# Patient Record
Sex: MALE | Race: WHITE | NOT HISPANIC OR LATINO | Employment: FULL TIME | URBAN - METROPOLITAN AREA
[De-identification: names, ages, dates, MRNs, and addresses within clinical notes are randomized per-mention and may not be internally consistent; named-entity substitution may affect disease eponyms.]

---

## 2023-06-26 LAB
CREATININE (MG/DL) IN SER/PLAS: 0.89 MG/DL (ref 0.5–1.3)
GFR MALE: >90 ML/MIN/1.73M2
PARATHYRIN INTACT (PG/ML) IN SER/PLAS: 53.7 PG/ML (ref 18.5–88)
POC CALCIUM IONIZED (MMOL/L) IN BLOOD: 1.18 MMOL/L (ref 1.1–1.33)
UREA NITROGEN (MG/DL) IN SER/PLAS: 25 MG/DL (ref 6–23)

## 2023-08-29 PROBLEM — E11.9 DIABETES MELLITUS, TYPE 2 (MULTI): Status: ACTIVE | Noted: 2023-08-29

## 2023-08-29 PROBLEM — C41.0: Status: ACTIVE | Noted: 2023-08-29

## 2023-08-29 PROBLEM — E66.9 OBESITY: Status: ACTIVE | Noted: 2023-08-29

## 2023-08-29 PROBLEM — I25.10 CORONARY ARTERIOSCLEROSIS: Status: ACTIVE | Noted: 2023-08-29

## 2023-08-29 PROBLEM — R07.9 CHEST PAIN: Status: ACTIVE | Noted: 2023-08-29

## 2023-08-29 PROBLEM — H90.A22 SENSORINEURAL HEARING LOSS (SNHL) OF LEFT EAR WITH RESTRICTED HEARING OF RIGHT EAR: Status: ACTIVE | Noted: 2023-08-29

## 2023-08-29 PROBLEM — Z85.828 HISTORY OF BASAL CELL CARCINOMA: Status: ACTIVE | Noted: 2023-08-29

## 2023-08-29 PROBLEM — H02.834 DERMATOCHALASIS OF LEFT UPPER EYELID: Status: ACTIVE | Noted: 2023-08-29

## 2023-08-29 PROBLEM — H93.8X1 MASS OF RIGHT EAR: Status: ACTIVE | Noted: 2023-08-29

## 2023-08-29 PROBLEM — E11.65 HYPERGLYCEMIA DUE TO TYPE 2 DIABETES MELLITUS (MULTI): Status: ACTIVE | Noted: 2023-08-29

## 2023-08-29 PROBLEM — E78.2 MIXED HYPERLIPIDEMIA: Status: ACTIVE | Noted: 2023-08-29

## 2023-08-29 PROBLEM — H90.A31 MIXED CONDUCTIVE AND SENSORINEURAL HEARING LOSS, UNILATERAL, RIGHT EAR WITH RESTRICTED HEARING ON THE CONTRALATERAL SIDE: Status: ACTIVE | Noted: 2023-08-29

## 2023-08-29 PROBLEM — I25.10 CORONARY ARTERY DISEASE INVOLVING NATIVE HEART WITHOUT ANGINA PECTORIS: Status: ACTIVE | Noted: 2023-08-29

## 2023-08-29 PROBLEM — H02.831 DERMATOCHALASIS OF RIGHT UPPER EYELID: Status: ACTIVE | Noted: 2023-08-29

## 2023-08-29 PROBLEM — D49.2: Status: ACTIVE | Noted: 2023-08-29

## 2023-08-29 PROBLEM — C44.212 BASAL CELL CARCINOMA OF RIGHT EAR: Status: ACTIVE | Noted: 2023-08-29

## 2023-08-29 PROBLEM — H25.10 NUCLEAR SENILE CATARACT: Status: ACTIVE | Noted: 2023-08-29

## 2023-08-29 PROBLEM — H92.11 OTORRHEA OF RIGHT EAR: Status: ACTIVE | Noted: 2023-08-29

## 2023-08-29 PROBLEM — D35.1 PARATHYROID ADENOMA: Status: ACTIVE | Noted: 2023-08-29

## 2023-08-29 PROBLEM — R53.83 FATIGUE: Status: ACTIVE | Noted: 2023-08-29

## 2023-08-29 PROBLEM — I25.2 HISTORY OF MYOCARDIAL INFARCTION: Status: ACTIVE | Noted: 2023-08-29

## 2023-08-29 PROBLEM — Z98.61 CORONARY ANGIOPLASTY STATUS: Status: ACTIVE | Noted: 2023-08-29

## 2023-08-29 PROBLEM — R06.09 DYSPNEA ON EXERTION: Status: ACTIVE | Noted: 2023-08-29

## 2023-08-29 PROBLEM — G47.9 SLEEP DISTURBANCE: Status: ACTIVE | Noted: 2023-08-29

## 2023-08-29 PROBLEM — I10 ESSENTIAL HYPERTENSION: Status: ACTIVE | Noted: 2023-08-29

## 2023-08-29 PROBLEM — I82.90 VENOUS THROMBOSIS: Status: ACTIVE | Noted: 2023-08-29

## 2023-08-29 PROBLEM — I21.3 ACUTE ST SEGMENT ELEVATION MYOCARDIAL INFARCTION (MULTI): Status: ACTIVE | Noted: 2023-08-29

## 2023-08-29 PROBLEM — H90.11 CONDUCTIVE HEARING LOSS OF RIGHT EAR: Status: ACTIVE | Noted: 2023-08-29

## 2023-08-29 PROBLEM — H93.13 TINNITUS OF BOTH EARS: Status: ACTIVE | Noted: 2023-08-29

## 2023-08-29 PROBLEM — H92.09 OTALGIA: Status: ACTIVE | Noted: 2023-08-29

## 2023-08-29 RX ORDER — PETROLATUM,WHITE 100 %
JELLY (GRAM) MISCELLANEOUS 2 TIMES DAILY
COMMUNITY
Start: 2022-07-08

## 2023-08-29 RX ORDER — INSULIN LISPRO 100 [IU]/ML
10 INJECTION, SOLUTION INTRAVENOUS; SUBCUTANEOUS
COMMUNITY

## 2023-08-29 RX ORDER — CHLORHEXIDINE GLUCONATE ORAL RINSE 1.2 MG/ML
SOLUTION DENTAL EVERY 12 HOURS
COMMUNITY
Start: 2022-06-23

## 2023-08-29 RX ORDER — NITROGLYCERIN 0.4 MG/1
TABLET SUBLINGUAL
COMMUNITY

## 2023-08-29 RX ORDER — CARBOXYMETHYLCELLULOSE SODIUM 5 MG/ML
SOLUTION/ DROPS OPHTHALMIC
COMMUNITY
Start: 2022-07-08

## 2023-08-29 RX ORDER — INSULIN LISPRO 100 [IU]/ML
INJECTION, SOLUTION INTRAVENOUS; SUBCUTANEOUS EVERY 8 HOURS
COMMUNITY
Start: 2019-09-23

## 2023-08-29 RX ORDER — INSULIN DETEMIR 100 [IU]/ML
INJECTION, SOLUTION SUBCUTANEOUS EVERY 24 HOURS
COMMUNITY
Start: 2022-03-03

## 2023-08-29 RX ORDER — ADHESIVE BANDAGE
15 BANDAGE TOPICAL
COMMUNITY
Start: 2022-07-08

## 2023-08-29 RX ORDER — CHLORTHALIDONE 25 MG/1
25 TABLET ORAL DAILY
COMMUNITY
End: 2024-04-29

## 2023-08-29 RX ORDER — CHLORHEXIDINE GLUCONATE 40 MG/ML
SOLUTION TOPICAL
COMMUNITY
Start: 2022-06-23

## 2023-08-29 RX ORDER — SENNOSIDES 8.6 MG/1
TABLET ORAL
COMMUNITY
Start: 2022-07-08

## 2023-08-29 RX ORDER — ATORVASTATIN CALCIUM 40 MG/1
40 TABLET, FILM COATED ORAL DAILY
COMMUNITY
End: 2024-04-29

## 2023-08-29 RX ORDER — CARVEDILOL 25 MG/1
25 TABLET ORAL
COMMUNITY
End: 2024-04-29

## 2023-08-29 RX ORDER — METFORMIN HYDROCHLORIDE 1000 MG/1
1000 TABLET ORAL
COMMUNITY
Start: 2022-03-03 | End: 2023-10-17

## 2023-08-29 RX ORDER — CARBOXYMETHYLCELLULOSE SODIUM 10 MG/ML
GEL OPHTHALMIC
COMMUNITY
Start: 2022-07-08

## 2023-08-29 RX ORDER — HYDRALAZINE HYDROCHLORIDE 25 MG/1
25 TABLET, FILM COATED ORAL 2 TIMES DAILY
COMMUNITY

## 2023-08-29 RX ORDER — PEN NEEDLE, DIABETIC 30 GX3/16"
NEEDLE, DISPOSABLE MISCELLANEOUS
COMMUNITY
Start: 2019-09-23

## 2023-08-29 RX ORDER — BLOOD SUGAR DIAGNOSTIC
STRIP MISCELLANEOUS
COMMUNITY
Start: 2021-08-26

## 2023-08-29 RX ORDER — INSULIN GLARGINE 100 [IU]/ML
INJECTION, SOLUTION SUBCUTANEOUS
COMMUNITY
Start: 2021-08-30

## 2023-08-29 RX ORDER — POLYETHYLENE GLYCOL 3350 17 G/17G
POWDER, FOR SOLUTION ORAL
COMMUNITY
Start: 2022-07-08

## 2023-08-29 RX ORDER — AMLODIPINE BESYLATE 10 MG/1
10 TABLET ORAL DAILY
COMMUNITY
End: 2024-04-29

## 2023-08-29 RX ORDER — SEMAGLUTIDE 0.68 MG/ML
INJECTION, SOLUTION SUBCUTANEOUS
COMMUNITY
Start: 2023-05-10 | End: 2024-04-08

## 2023-08-29 RX ORDER — LISINOPRIL 40 MG/1
40 TABLET ORAL DAILY
COMMUNITY

## 2023-08-29 RX ORDER — OXYCODONE HYDROCHLORIDE 5 MG/1
TABLET ORAL
COMMUNITY
Start: 2022-07-08

## 2023-08-29 RX ORDER — ACETAMINOPHEN 160 MG/5ML
200 SUSPENSION, ORAL (FINAL DOSE FORM) ORAL DAILY
COMMUNITY
Start: 2020-04-06

## 2023-08-29 RX ORDER — ASPIRIN 81 MG/1
81 TABLET ORAL DAILY
COMMUNITY
Start: 2022-03-03

## 2023-08-29 RX ORDER — ONDANSETRON 4 MG/1
TABLET, FILM COATED ORAL
COMMUNITY
Start: 2022-07-08

## 2023-08-29 RX ORDER — INSULIN GLARGINE 100 [IU]/ML
30 INJECTION, SOLUTION SUBCUTANEOUS
COMMUNITY

## 2023-09-24 RX ORDER — SYRINGE,SAFETY WITH NEEDLE,1ML 25GX1"
SYRINGE (EA) MISCELLANEOUS
COMMUNITY
Start: 2019-09-23

## 2023-10-06 DIAGNOSIS — Z01.810 ENCOUNTER FOR PREPROCEDURAL CARDIOVASCULAR EXAMINATION: ICD-10-CM

## 2023-10-17 RX ORDER — METFORMIN HYDROCHLORIDE 1000 MG/1
TABLET ORAL
Qty: 180 TABLET | Refills: 3 | Status: SHIPPED | OUTPATIENT
Start: 2023-10-17

## 2023-10-23 ENCOUNTER — APPOINTMENT (OUTPATIENT)
Dept: OTOLARYNGOLOGY | Facility: HOSPITAL | Age: 68
End: 2023-10-23
Payer: COMMERCIAL

## 2023-11-03 ENCOUNTER — LAB (OUTPATIENT)
Dept: LAB | Facility: LAB | Age: 68
End: 2023-11-03
Payer: COMMERCIAL

## 2023-11-03 DIAGNOSIS — Z01.89 ENCOUNTER FOR OTHER SPECIFIED SPECIAL EXAMINATIONS: Primary | ICD-10-CM

## 2023-11-03 DIAGNOSIS — E11.65 TYPE 2 DIABETES MELLITUS WITH HYPERGLYCEMIA (MULTI): ICD-10-CM

## 2023-11-03 LAB
ALBUMIN SERPL-MCNC: 4.4 G/DL (ref 3.5–5)
ALP BLD-CCNC: 100 U/L (ref 35–125)
ALT SERPL-CCNC: 25 U/L (ref 5–40)
ANION GAP SERPL CALC-SCNC: 15 MMOL/L
AST SERPL-CCNC: 23 U/L (ref 5–40)
BASOPHILS # BLD AUTO: 0.05 X10*3/UL (ref 0–0.1)
BASOPHILS NFR BLD AUTO: 0.4 %
BILIRUB DIRECT SERPL-MCNC: <0.2 MG/DL (ref 0–0.2)
BILIRUB SERPL-MCNC: 0.4 MG/DL (ref 0.1–1.2)
BUN SERPL-MCNC: 24 MG/DL (ref 8–25)
CALCIUM SERPL-MCNC: 9.2 MG/DL (ref 8.5–10.4)
CHLORIDE SERPL-SCNC: 103 MMOL/L (ref 97–107)
CO2 SERPL-SCNC: 27 MMOL/L (ref 24–31)
CREAT SERPL-MCNC: 0.9 MG/DL (ref 0.4–1.6)
EOSINOPHIL # BLD AUTO: 0.49 X10*3/UL (ref 0–0.7)
EOSINOPHIL NFR BLD AUTO: 4.3 %
ERYTHROCYTE [DISTWIDTH] IN BLOOD BY AUTOMATED COUNT: 13.7 % (ref 11.5–14.5)
EST. AVERAGE GLUCOSE BLD GHB EST-MCNC: 143 MG/DL
GFR SERPL CREATININE-BSD FRML MDRD: >90 ML/MIN/1.73M*2
GLUCOSE SERPL-MCNC: 101 MG/DL (ref 65–99)
HBA1C MFR BLD: 6.6 %
HCT VFR BLD AUTO: 45.9 % (ref 41–52)
HGB BLD-MCNC: 15.9 G/DL (ref 13.5–17.5)
IMM GRANULOCYTES # BLD AUTO: 0.05 X10*3/UL (ref 0–0.7)
IMM GRANULOCYTES NFR BLD AUTO: 0.4 % (ref 0–0.9)
LYMPHOCYTES # BLD AUTO: 2.35 X10*3/UL (ref 1.2–4.8)
LYMPHOCYTES NFR BLD AUTO: 20.8 %
MCH RBC QN AUTO: 30.3 PG (ref 26–34)
MCHC RBC AUTO-ENTMCNC: 34.6 G/DL (ref 32–36)
MCV RBC AUTO: 88 FL (ref 80–100)
MONOCYTES # BLD AUTO: 0.87 X10*3/UL (ref 0.1–1)
MONOCYTES NFR BLD AUTO: 7.7 %
NEUTROPHILS # BLD AUTO: 7.5 X10*3/UL (ref 1.2–7.7)
NEUTROPHILS NFR BLD AUTO: 66.4 %
NRBC BLD-RTO: 0 /100 WBCS (ref 0–0)
PLATELET # BLD AUTO: 253 X10*3/UL (ref 150–450)
POTASSIUM SERPL-SCNC: 3.7 MMOL/L (ref 3.4–5.1)
PROT SERPL-MCNC: 6.8 G/DL (ref 5.9–7.9)
RBC # BLD AUTO: 5.24 X10*6/UL (ref 4.5–5.9)
SODIUM SERPL-SCNC: 145 MMOL/L (ref 133–145)
WBC # BLD AUTO: 11.3 X10*3/UL (ref 4.4–11.3)

## 2023-11-03 PROCEDURE — 80053 COMPREHEN METABOLIC PANEL: CPT

## 2023-11-03 PROCEDURE — 83036 HEMOGLOBIN GLYCOSYLATED A1C: CPT

## 2023-11-03 PROCEDURE — 82248 BILIRUBIN DIRECT: CPT

## 2023-11-03 PROCEDURE — 36415 COLL VENOUS BLD VENIPUNCTURE: CPT

## 2023-11-03 PROCEDURE — 85025 COMPLETE CBC W/AUTO DIFF WBC: CPT

## 2023-11-08 ENCOUNTER — APPOINTMENT (OUTPATIENT)
Dept: PRIMARY CARE | Facility: CLINIC | Age: 68
End: 2023-11-08
Payer: COMMERCIAL

## 2023-11-27 ENCOUNTER — OFFICE VISIT (OUTPATIENT)
Dept: OTOLARYNGOLOGY | Facility: HOSPITAL | Age: 68
End: 2023-11-27
Payer: COMMERCIAL

## 2023-11-27 VITALS — BODY MASS INDEX: 37.36 KG/M2 | HEIGHT: 69 IN | WEIGHT: 252.2 LBS | TEMPERATURE: 96.8 F

## 2023-11-27 DIAGNOSIS — C44.201 MALIGNANT NEOPLASM OF SKIN OF EAR AND EXTERNAL AURICULAR CANAL: Primary | ICD-10-CM

## 2023-11-27 DIAGNOSIS — C41.0: ICD-10-CM

## 2023-11-27 PROCEDURE — 1159F MED LIST DOCD IN RCRD: CPT | Performed by: OTOLARYNGOLOGY

## 2023-11-27 PROCEDURE — 99213 OFFICE O/P EST LOW 20 MIN: CPT | Performed by: OTOLARYNGOLOGY

## 2023-11-27 PROCEDURE — 3044F HG A1C LEVEL LT 7.0%: CPT | Performed by: OTOLARYNGOLOGY

## 2023-11-27 PROCEDURE — 4010F ACE/ARB THERAPY RXD/TAKEN: CPT | Performed by: OTOLARYNGOLOGY

## 2023-11-27 PROCEDURE — 1160F RVW MEDS BY RX/DR IN RCRD: CPT | Performed by: OTOLARYNGOLOGY

## 2023-11-27 RX ORDER — BLOOD-GLUCOSE SENSOR
EACH MISCELLANEOUS
COMMUNITY
Start: 2023-05-23

## 2023-11-27 RX ORDER — PEN NEEDLE, DIABETIC 31 GX5/16"
NEEDLE, DISPOSABLE MISCELLANEOUS
COMMUNITY
Start: 2023-08-10

## 2023-11-27 ASSESSMENT — PATIENT HEALTH QUESTIONNAIRE - PHQ9
2. FEELING DOWN, DEPRESSED OR HOPELESS: NOT AT ALL
SUM OF ALL RESPONSES TO PHQ9 QUESTIONS 1 & 2: 0
1. LITTLE INTEREST OR PLEASURE IN DOING THINGS: NOT AT ALL

## 2023-11-27 NOTE — PROGRESS NOTES
Mr. Nunez is a very pleasant 68-year-old gentleman who was in his usual state of health until approximately 2-1/2 years ago when he noticed a scab in his right ear. He stated that this would occasionally bleed. He saw his primary care doctor around the time that this began however did not mention this lesion upon that evaluation. Then following this visit, the pandemic struck in he has not sought medical care for over 2 years. He also works nights and has very little contact with other individuals. Most recently he noted that this area in his ear has grown substantially and sought medical attention. 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 It has completely obstructed his ear canal and has somewhat impaired his hearing. He denies any facial twitching pain or facial weakness. On 7/5/2022 he underwent an extensive resection of this lesion including a partial auriculectomy, parotidectomy, neck dissection, temporal bone resection and left lateral arm free flap. He did well with this procedure, free from complications. A recent CT of his neck was performed which showed no evidence of recurrent disease.     On physical examination, he has a well healed flap with incisions that are clean, dry and intact. 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 His facial nerve has normal function.  The remainder of his head neck exam is within normal limits.       My impression is that Mr. Nunez has recovered well from his surgery and did not require adjuvant radiation therapy. He needs to have his flap debulked and his ptotic ear repaired for optimal functional and cosmetic outcome. He plans to schedule a cochlear implant procedure with Dr. Nuñez after the new year and ideally, we coordinate the surgery so that we could perform both procedures in one setting.   Otherwise, the patient will see me back in 4 months and we will perform another CT neck next  summer.

## 2023-12-15 ENCOUNTER — APPOINTMENT (OUTPATIENT)
Dept: PRIMARY CARE | Facility: CLINIC | Age: 68
End: 2023-12-15
Payer: COMMERCIAL

## 2024-04-06 DIAGNOSIS — E11.65 TYPE 2 DIABETES MELLITUS WITH HYPERGLYCEMIA (MULTI): ICD-10-CM

## 2024-04-08 RX ORDER — SEMAGLUTIDE 0.68 MG/ML
INJECTION, SOLUTION SUBCUTANEOUS
Qty: 3 ML | Refills: 3 | Status: SHIPPED | OUTPATIENT
Start: 2024-04-08

## 2024-04-28 DIAGNOSIS — E78.2 MIXED HYPERLIPIDEMIA: ICD-10-CM

## 2024-04-28 DIAGNOSIS — I10 ESSENTIAL (PRIMARY) HYPERTENSION: ICD-10-CM

## 2024-04-29 RX ORDER — AMLODIPINE BESYLATE 10 MG/1
10 TABLET ORAL DAILY
Qty: 90 TABLET | Refills: 3 | Status: SHIPPED | OUTPATIENT
Start: 2024-04-29

## 2024-04-29 RX ORDER — CHLORTHALIDONE 25 MG/1
25 TABLET ORAL DAILY
Qty: 90 TABLET | Refills: 3 | Status: SHIPPED | OUTPATIENT
Start: 2024-04-29

## 2024-04-29 RX ORDER — CARVEDILOL 25 MG/1
TABLET ORAL
Qty: 180 TABLET | Refills: 3 | Status: SHIPPED | OUTPATIENT
Start: 2024-04-29

## 2024-04-29 RX ORDER — ATORVASTATIN CALCIUM 40 MG/1
40 TABLET, FILM COATED ORAL DAILY
Qty: 90 TABLET | Refills: 3 | Status: SHIPPED | OUTPATIENT
Start: 2024-04-29

## 2024-05-12 DIAGNOSIS — E11.65 TYPE 2 DIABETES MELLITUS WITH HYPERGLYCEMIA (MULTI): ICD-10-CM

## 2024-06-10 ENCOUNTER — APPOINTMENT (OUTPATIENT)
Dept: OTOLARYNGOLOGY | Facility: HOSPITAL | Age: 69
End: 2024-06-10
Payer: COMMERCIAL

## 2024-08-09 DIAGNOSIS — I10 ESSENTIAL (PRIMARY) HYPERTENSION: ICD-10-CM

## 2024-08-09 DIAGNOSIS — Z01.810 ENCOUNTER FOR PREPROCEDURAL CARDIOVASCULAR EXAMINATION: ICD-10-CM

## 2024-08-09 RX ORDER — LISINOPRIL 40 MG/1
40 TABLET ORAL DAILY
Qty: 90 TABLET | Refills: 3 | Status: SHIPPED | OUTPATIENT
Start: 2024-08-09

## 2024-08-09 RX ORDER — METFORMIN HYDROCHLORIDE 1000 MG/1
1000 TABLET ORAL
Qty: 180 TABLET | Refills: 3 | Status: SHIPPED | OUTPATIENT
Start: 2024-08-09

## 2024-08-09 RX ORDER — HYDRALAZINE HYDROCHLORIDE 25 MG/1
TABLET, FILM COATED ORAL
Qty: 180 TABLET | Refills: 3 | Status: SHIPPED | OUTPATIENT
Start: 2024-08-09

## 2024-09-06 DIAGNOSIS — E11.65 TYPE 2 DIABETES MELLITUS WITH HYPERGLYCEMIA (MULTI): ICD-10-CM

## 2024-09-06 RX ORDER — SEMAGLUTIDE 0.68 MG/ML
INJECTION, SOLUTION SUBCUTANEOUS
Qty: 3 ML | Refills: 3 | Status: SHIPPED | OUTPATIENT
Start: 2024-09-06

## 2024-09-22 DIAGNOSIS — E11.65 TYPE 2 DIABETES MELLITUS WITH HYPERGLYCEMIA, WITHOUT LONG-TERM CURRENT USE OF INSULIN: Primary | ICD-10-CM

## 2024-09-23 RX ORDER — BLOOD SUGAR DIAGNOSTIC
STRIP MISCELLANEOUS
Qty: 300 STRIP | Refills: 3 | Status: SHIPPED | OUTPATIENT
Start: 2024-09-23

## 2024-09-25 ENCOUNTER — OFFICE VISIT (OUTPATIENT)
Dept: OPHTHALMOLOGY | Facility: CLINIC | Age: 69
End: 2024-09-25
Payer: COMMERCIAL

## 2024-09-25 DIAGNOSIS — H25.813 COMBINED FORMS OF AGE-RELATED CATARACT OF BOTH EYES: ICD-10-CM

## 2024-09-25 DIAGNOSIS — H02.834 DERMATOCHALASIS OF BOTH UPPER EYELIDS: ICD-10-CM

## 2024-09-25 DIAGNOSIS — E11.69 TYPE 2 DIABETES MELLITUS WITH OTHER SPECIFIED COMPLICATION, UNSPECIFIED WHETHER LONG TERM INSULIN USE (MULTI): ICD-10-CM

## 2024-09-25 DIAGNOSIS — H57.03 MIOSIS NOT DUE TO MIOTICS: ICD-10-CM

## 2024-09-25 DIAGNOSIS — H02.831 DERMATOCHALASIS OF BOTH UPPER EYELIDS: ICD-10-CM

## 2024-09-25 DIAGNOSIS — H40.003 PREGLAUCOMA, BILATERAL: Primary | ICD-10-CM

## 2024-09-25 PROBLEM — E11.65 HYPERGLYCEMIA DUE TO TYPE 2 DIABETES MELLITUS (MULTI): Status: RESOLVED | Noted: 2023-08-29 | Resolved: 2024-09-25

## 2024-09-25 PROBLEM — I82.90 VENOUS THROMBOSIS: Status: RESOLVED | Noted: 2023-08-29 | Resolved: 2024-09-25

## 2024-09-25 PROCEDURE — 92133 CPTRZD OPH DX IMG PST SGM ON: CPT | Performed by: OPHTHALMOLOGY

## 2024-09-25 PROCEDURE — 99214 OFFICE O/P EST MOD 30 MIN: CPT | Performed by: OPHTHALMOLOGY

## 2024-09-25 RX ORDER — BLOOD-GLUCOSE TRANSMITTER
EACH MISCELLANEOUS
COMMUNITY
Start: 2024-02-19

## 2024-09-25 ASSESSMENT — EXTERNAL EXAM - RIGHT EYE: OD_EXAM: NORMAL

## 2024-09-25 ASSESSMENT — VISUAL ACUITY
OS_SC+: +1
OD_SC: 20/25
METHOD: SNELLEN - SINGLE
OD_SC+: +1
OS_SC: 20/40

## 2024-09-25 ASSESSMENT — EXTERNAL EXAM - LEFT EYE: OS_EXAM: NORMAL

## 2024-09-25 ASSESSMENT — ENCOUNTER SYMPTOMS
NEUROLOGICAL NEGATIVE: 0
RESPIRATORY NEGATIVE: 0
ALLERGIC/IMMUNOLOGIC NEGATIVE: 0
GASTROINTESTINAL NEGATIVE: 0
HEMATOLOGIC/LYMPHATIC NEGATIVE: 0
CONSTITUTIONAL NEGATIVE: 0
EYES NEGATIVE: 0
ENDOCRINE NEGATIVE: 0
PSYCHIATRIC NEGATIVE: 0
MUSCULOSKELETAL NEGATIVE: 0
CARDIOVASCULAR NEGATIVE: 0

## 2024-09-25 ASSESSMENT — CUP TO DISC RATIO
OD_RATIO: 0.55
OS_RATIO: 0.55

## 2024-09-25 ASSESSMENT — PATIENT HEALTH QUESTIONNAIRE - PHQ9
SUM OF ALL RESPONSES TO PHQ9 QUESTIONS 1 AND 2: 0
2. FEELING DOWN, DEPRESSED OR HOPELESS: NOT AT ALL
1. LITTLE INTEREST OR PLEASURE IN DOING THINGS: NOT AT ALL

## 2024-09-25 ASSESSMENT — TONOMETRY
OS_IOP_MMHG: 14
OD_IOP_MMHG: 14
IOP_METHOD: GOLDMANN APPLANATION

## 2024-09-25 ASSESSMENT — SLIT LAMP EXAM - LIDS
COMMENTS: 2+ DERMATOCHALASIS - UPPER LID, 1+ BLEPHARITIS
COMMENTS: 2+ DERMATOCHALASIS - UPPER LID, 1+ BLEPHARITIS

## 2024-09-25 ASSESSMENT — PAIN SCALES - GENERAL: PAINLEVEL: 0-NO PAIN

## 2024-09-25 NOTE — PROGRESS NOTES
"Subjective   Patient ID: Kevin Nunez is a 69 y.o. male.    Chief Complaint    Annual Exam; Decreased Visual Acuity       HPI       Decreased Visual Acuity    Presenting in both eyes.  Severity of the pain is mild.  Context: distance vision and near vision.             Comments    PATIENT DENIES REFRACTION    Complete, pre-glaucoma, and diabetic dilated exam.  No new changes in health history or meds.  Vision essentially unchanged.  No new problems or complaints.              Last edited by Tamir Edwards MD on 9/25/2024  9:18 AM.        Current Outpatient Medications (Ophthalmology pharm classes)   Medication Sig Dispense Refill    carboxymethylcellulose (Refresh Celluvisc) 1 % ophthalmic solution dropperette Administer 1 drop into both eyes if needed. Take per directed      carboxymethylcellulose (Refresh Plus) 0.5 % ophthalmic solution Administer into affected eye(s). Take per directed       Current Outpatient Medications (Other)   Medication Sig Dispense Refill    amLODIPine (Norvasc) 10 mg tablet TAKE 1 TABLET BY MOUTH EVERY DAY 90 tablet 3    aspirin 81 mg EC tablet Take 1 tablet (81 mg) by mouth once daily.      atorvastatin (Lipitor) 40 mg tablet TAKE 1 TABLET BY MOUTH EVERY DAY 90 tablet 3    BD Ultra-Fine Mini Pen Needle 31 gauge x 3/16\" needle       carvedilol (Coreg) 25 mg tablet TAKE 1 TABLET BY MOUTH TWICE A DAY WITH FOOD FOR 90 DAYS 180 tablet 3    chlorhexidine (Hibiclens) 4 % external liquid Take per directed      chlorhexidine (Peridex) 0.12 % solution Take by mouth every 12 hours. Take per directed      chlorthalidone (Hygroton) 25 mg tablet TAKE 1 TABLET BY MOUTH EVERY DAY 90 tablet 3    CHLORTHALIDONE ORAL Take by mouth. Take per directed      coenzyme Q-10 200 mg capsule Take 1 capsule (200 mg) by mouth once daily. With a meal      coenzyme Q-10 300 mg capsule capsule Take by mouth. Take per directed      Dexcom G6 Sensor device AS DIRECTED, 1 SENSOR EVERY 10 DAYS 30 DAYS      Dexcom G6 " "Transmitter device AS DIRECTED WITH SENSOR DAILY 90 DAYS      empagliflozin (Jardiance) 25 mg Take 1 tablet (25 mg) by mouth once daily. 90 tablet 3    hydrALAZINE (Apresoline) 25 mg tablet TAKE 1 TABLET BY MOUTH TWICE A DAY WITH FOOD FOR 90 DAYS 180 tablet 3    insulin detemir (Levemir FlexPen) 100 unit/mL (3 mL) pen once every 24 hours. Take per directed      insulin detemir (Levemir) 100 unit/mL injection Inject 45 Doses under the skin. Take as directed      INSULIN DETEMIR U-100 SUBQ Inject under the skin. Take per directed      insulin glargine (Lantus Solostar U-100 Insulin) 100 unit/mL (3 mL) pen Inject under the skin. Take per directed      insulin glargine (Lantus U-100 Insulin) 100 unit/mL injection 30 Units. Take per directed      insulin lispro (HumaLOG KwikPen Insulin) 100 unit/mL injection every 8 hours. Take per directed      insulin lispro (HumaLOG U-100 Insulin) 100 unit/mL injection 10 Units. Take per directed      insulin syringe-needle U-100 (BD Insulin Syringe Ultra-Fine) 31G X 5/16\" 1 mL syringe Inject under the skin.      lisinopril 40 mg tablet Take 1 tablet (40 mg) by mouth once daily. 90 tablet 3    magnesium hydroxide (Milk of Magnesia) 400 mg/5 mL suspension Take 15 mL by mouth. Take per directed      metFORMIN (Glucophage) 1,000 mg tablet TAKE 1 TABLET BY MOUTH TWICE A DAY WITH MEALS 180 tablet 3    nitroglycerin (Nitrostat) 0.4 mg SL tablet Take per directed      ondansetron (Zofran) 4 mg tablet Take by mouth. Take per directed      OneTouch Ultra Test strip USE TO TEST 3 TIMES DAILY ICD 10: D94101 E11.9 90 300 strip 3    oxyCODONE (Roxicodone) 5 mg immediate release tablet Take by mouth. Take per directed      Ozempic 0.25 mg or 0.5 mg (2 mg/3 mL) pen injector INJECT 0.5 MG SUBCUTANEOUSLY ONE TIME PER WEEK 3 mL 3    pen needle, diabetic 31 gauge x 5/16\" needle Take per directed      petrolatum,white (white petrolatum, bulk,) 100 % gel Apply topically twice a day. Take per directed      " polyethylene glycol (Glycolax, Miralax) 17 gram/dose powder Take by mouth. Take per directed      sennosides (Senokot) 8.6 mg tablet Take by mouth. Take per directed         Objective   Base Eye Exam       Visual Acuity (Snellen - Single)         Right Left Both    Dist sc 20/25 +1 20/40 +1     Near cc   J1+              Tonometry (Goldmann Applanation, 8:42 AM)         Right Left    Pressure 14 14              Pupils         Dark Shape React APD    Right 3 Miotic 1 None    Left 3 Miotic 1 None              Extraocular Movement         Right Left     Full Full              Dilation       Both eyes: 1% Tropic 2.5% Phen @ 8:42 AM              Dilation #2       Both eyes: 1.0% cyclopentolate (Cyclogyl) @ 8:42am                  Slit Lamp and Fundus Exam       External Exam         Right Left    External Normal Normal              Slit Lamp Exam         Right Left    Lids/Lashes 2+ Dermatochalasis - upper lid, 1+ Blepharitis 2+ Dermatochalasis - upper lid, 1+ Blepharitis    Conjunctiva/Sclera normal bulbar and palepbral conjunctiva normal bulbar and palepbral conjunctiva    Cornea normal epi/stroma/endo and tear film normal epi/stroma/endo and tear film    Anterior Chamber ant. chamber deep and quiet ant. chamber deep and quiet    Iris pupil miotic pupil miotic    Lens 1+ nuclear sclerosis, Trace Cortical cataract 1+ nuclear sclerosis, Trace Cortical cataract    Anterior Vitreous Vitreous syneresis Vitreous syneresis              Fundus Exam         Right Left    Disc Deep Cup/ Full Rim Deep Cup/ Full Rim    C/D Ratio 0.55 0.55    Macula No background diabetic retinopathy No background diabetic retinopathy    Vessels normal retinal vessels normal retinal vessels    Periphery normal retinal periphery normal retinal periphery                    Assessment/Plan   Problem List Items Addressed This Visit          Eye/Vision problems    Dermatochalasis of both upper eyelids    Diabetes mellitus, type 2 (Multi)     F/u one  year full with oct nerves.           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

## 2024-09-25 NOTE — LETTER
September 25, 2024    Abdirahman Carrizales MD  [address] [place]  [address]  [place] [address]     Patient: Kevin Nunez   YOB: 1955   Date of Visit: 9/25/2024       Dear Dr. Abdirahman Carrizales MD:    Thank you for referring Kevin Nunez to me for evaluation. Here is my assessment and plan of care:    Assessment/Plan:  Kevin was seen today for annual exam and decreased visual acuity.  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 If you have questions, please do not hesitate to call me. I look forward to following Kevin along with you.     No signs of background diabetic retinopathy (BDR) OU.  F/u one year.     Sincerely,        Tamir Edwards MD        CC:   No Recipients        Base Eye Exam       Visual Acuity (Snellen - Single)         Right Left Both    Dist sc 20/25 +1 20/40 +1     Near cc   J1+              Tonometry (Goldmann Applanation, 8:42 AM)         Right Left    Pressure 14 14              Pupils         Dark Shape React APD    Right 3 Miotic 1 None    Left 3 Miotic 1 None              Extraocular Movement         Right Left     Full Full              Dilation       Both eyes: 1% Tropic 2.5% Phen @ 8:42 AM              Dilation #2       Both eyes: 1.0% cyclopentolate (Cyclogyl) @ 8:42am                  Slit Lamp and Fundus Exam       External Exam         Right Left    External Normal Normal              Slit Lamp Exam         Right Left    Lids/Lashes 2+ Dermatochalasis - upper lid, 1+ Blepharitis 2+ Dermatochalasis - upper lid, 1+ Blepharitis    Conjunctiva/Sclera normal bulbar and palepbral conjunctiva normal bulbar and palepbral conjunctiva     Cornea normal epi/stroma/endo and tear film normal epi/stroma/endo and tear film    Anterior Chamber ant. chamber deep and quiet ant. chamber deep and quiet    Iris pupil miotic pupil miotic    Lens 1+ nuclear sclerosis, Trace Cortical cataract 1+ nuclear sclerosis, Trace Cortical cataract    Anterior Vitreous Vitreous syneresis Vitreous syneresis              Fundus Exam         Right Left    Disc Deep Cup/ Full Rim Deep Cup/ Full Rim    C/D Ratio 0.55 0.55    Macula No background diabetic retinopathy No background diabetic retinopathy    Vessels normal retinal vessels normal retinal vessels    Periphery normal retinal periphery normal retinal periphery

## 2024-12-28 DIAGNOSIS — E11.65 TYPE 2 DIABETES MELLITUS WITH HYPERGLYCEMIA (MULTI): ICD-10-CM

## 2024-12-30 RX ORDER — SEMAGLUTIDE 0.68 MG/ML
INJECTION, SOLUTION SUBCUTANEOUS
Qty: 3 ML | Refills: 3 | Status: SHIPPED | OUTPATIENT
Start: 2024-12-30

## 2025-02-02 DIAGNOSIS — E78.2 MIXED HYPERLIPIDEMIA: ICD-10-CM

## 2025-02-02 DIAGNOSIS — I10 ESSENTIAL (PRIMARY) HYPERTENSION: ICD-10-CM

## 2025-02-03 RX ORDER — AMLODIPINE BESYLATE 10 MG/1
10 TABLET ORAL DAILY
Qty: 90 TABLET | Refills: 3 | Status: SHIPPED | OUTPATIENT
Start: 2025-02-03

## 2025-02-03 RX ORDER — CARVEDILOL 25 MG/1
TABLET ORAL
Qty: 180 TABLET | Refills: 3 | Status: SHIPPED | OUTPATIENT
Start: 2025-02-03

## 2025-02-03 RX ORDER — ATORVASTATIN CALCIUM 40 MG/1
40 TABLET, FILM COATED ORAL DAILY
Qty: 90 TABLET | Refills: 3 | Status: SHIPPED | OUTPATIENT
Start: 2025-02-03

## 2025-03-07 DIAGNOSIS — I10 ESSENTIAL (PRIMARY) HYPERTENSION: ICD-10-CM

## 2025-03-07 RX ORDER — CHLORTHALIDONE 25 MG/1
25 TABLET ORAL DAILY
Qty: 90 TABLET | Refills: 3 | Status: SHIPPED | OUTPATIENT
Start: 2025-03-07

## 2025-03-17 ENCOUNTER — OFFICE VISIT (OUTPATIENT)
Dept: PRIMARY CARE | Facility: CLINIC | Age: 70
End: 2025-03-17
Payer: COMMERCIAL

## 2025-03-17 VITALS
DIASTOLIC BLOOD PRESSURE: 70 MMHG | SYSTOLIC BLOOD PRESSURE: 122 MMHG | OXYGEN SATURATION: 93 % | HEIGHT: 69 IN | BODY MASS INDEX: 32.88 KG/M2 | WEIGHT: 222 LBS | HEART RATE: 95 BPM

## 2025-03-17 DIAGNOSIS — Z01.89 ENCOUNTER FOR ROUTINE LABORATORY TESTING: ICD-10-CM

## 2025-03-17 DIAGNOSIS — Z85.828 HISTORY OF BASAL CELL CARCINOMA: ICD-10-CM

## 2025-03-17 DIAGNOSIS — Z87.891 PERSONAL HISTORY OF NICOTINE DEPENDENCE: ICD-10-CM

## 2025-03-17 DIAGNOSIS — E11.69 TYPE 2 DIABETES MELLITUS WITH OTHER SPECIFIED COMPLICATION, WITH LONG-TERM CURRENT USE OF INSULIN: ICD-10-CM

## 2025-03-17 DIAGNOSIS — R42 EPISODIC LIGHTHEADEDNESS: ICD-10-CM

## 2025-03-17 DIAGNOSIS — Z79.4 TYPE 2 DIABETES MELLITUS WITH OTHER SPECIFIED COMPLICATION, WITH LONG-TERM CURRENT USE OF INSULIN: ICD-10-CM

## 2025-03-17 DIAGNOSIS — E78.2 MIXED HYPERLIPIDEMIA: ICD-10-CM

## 2025-03-17 DIAGNOSIS — Z12.12 ENCOUNTER FOR COLORECTAL CANCER SCREENING: ICD-10-CM

## 2025-03-17 DIAGNOSIS — Z23 ENCOUNTER FOR IMMUNIZATION: ICD-10-CM

## 2025-03-17 DIAGNOSIS — E55.9 VITAMIN D DEFICIENCY: ICD-10-CM

## 2025-03-17 DIAGNOSIS — Z12.5 ENCOUNTER FOR PROSTATE CANCER SCREENING: ICD-10-CM

## 2025-03-17 DIAGNOSIS — Z13.6 ENCOUNTER FOR ABDOMINAL AORTIC ANEURYSM (AAA) SCREENING: ICD-10-CM

## 2025-03-17 DIAGNOSIS — I1A.0 RESISTANT HYPERTENSION: ICD-10-CM

## 2025-03-17 DIAGNOSIS — E66.811 CLASS 1 OBESITY WITH SERIOUS COMORBIDITY AND BODY MASS INDEX (BMI) OF 32.0 TO 32.9 IN ADULT, UNSPECIFIED OBESITY TYPE: ICD-10-CM

## 2025-03-17 DIAGNOSIS — Z12.11 ENCOUNTER FOR COLORECTAL CANCER SCREENING: ICD-10-CM

## 2025-03-17 DIAGNOSIS — Z00.00 ANNUAL PHYSICAL EXAM: Primary | ICD-10-CM

## 2025-03-17 DIAGNOSIS — I25.10 CORONARY ARTERY DISEASE INVOLVING NATIVE CORONARY ARTERY OF NATIVE HEART WITHOUT ANGINA PECTORIS: ICD-10-CM

## 2025-03-17 PROBLEM — I21.3 ACUTE ST SEGMENT ELEVATION MYOCARDIAL INFARCTION (MULTI): Status: RESOLVED | Noted: 2023-08-29 | Resolved: 2025-03-17

## 2025-03-17 PROBLEM — Z95.5 HISTORY OF CORONARY ARTERY STENT PLACEMENT: Status: ACTIVE | Noted: 2025-03-17

## 2025-03-17 PROBLEM — Z98.61 CORONARY ANGIOPLASTY STATUS: Status: RESOLVED | Noted: 2023-08-29 | Resolved: 2025-03-17

## 2025-03-17 PROBLEM — C44.212 BASAL CELL CARCINOMA OF RIGHT EAR: Status: RESOLVED | Noted: 2023-08-29 | Resolved: 2025-03-17

## 2025-03-17 PROBLEM — R07.9 CHEST PAIN: Status: RESOLVED | Noted: 2023-08-29 | Resolved: 2025-03-17

## 2025-03-17 PROBLEM — E78.5 HLD (HYPERLIPIDEMIA): Status: ACTIVE | Noted: 2023-08-29

## 2025-03-17 PROBLEM — D49.2: Status: RESOLVED | Noted: 2023-08-29 | Resolved: 2025-03-17

## 2025-03-17 PROBLEM — G47.9 SLEEP DISTURBANCE: Status: RESOLVED | Noted: 2023-08-29 | Resolved: 2025-03-17

## 2025-03-17 PROBLEM — R53.83 FATIGUE: Status: RESOLVED | Noted: 2023-08-29 | Resolved: 2025-03-17

## 2025-03-17 PROBLEM — R06.09 DYSPNEA ON EXERTION: Status: RESOLVED | Noted: 2023-08-29 | Resolved: 2025-03-17

## 2025-03-17 PROBLEM — C41.0: Status: RESOLVED | Noted: 2023-08-29 | Resolved: 2025-03-17

## 2025-03-17 PROCEDURE — 1158F ADVNC CARE PLAN TLK DOCD: CPT | Performed by: STUDENT IN AN ORGANIZED HEALTH CARE EDUCATION/TRAINING PROGRAM

## 2025-03-17 PROCEDURE — 99397 PER PM REEVAL EST PAT 65+ YR: CPT | Performed by: STUDENT IN AN ORGANIZED HEALTH CARE EDUCATION/TRAINING PROGRAM

## 2025-03-17 PROCEDURE — 1159F MED LIST DOCD IN RCRD: CPT | Performed by: STUDENT IN AN ORGANIZED HEALTH CARE EDUCATION/TRAINING PROGRAM

## 2025-03-17 PROCEDURE — 1123F ACP DISCUSS/DSCN MKR DOCD: CPT | Performed by: STUDENT IN AN ORGANIZED HEALTH CARE EDUCATION/TRAINING PROGRAM

## 2025-03-17 PROCEDURE — 3078F DIAST BP <80 MM HG: CPT | Performed by: STUDENT IN AN ORGANIZED HEALTH CARE EDUCATION/TRAINING PROGRAM

## 2025-03-17 PROCEDURE — 1126F AMNT PAIN NOTED NONE PRSNT: CPT | Performed by: STUDENT IN AN ORGANIZED HEALTH CARE EDUCATION/TRAINING PROGRAM

## 2025-03-17 PROCEDURE — 3008F BODY MASS INDEX DOCD: CPT | Performed by: STUDENT IN AN ORGANIZED HEALTH CARE EDUCATION/TRAINING PROGRAM

## 2025-03-17 PROCEDURE — 4010F ACE/ARB THERAPY RXD/TAKEN: CPT | Performed by: STUDENT IN AN ORGANIZED HEALTH CARE EDUCATION/TRAINING PROGRAM

## 2025-03-17 PROCEDURE — 3074F SYST BP LT 130 MM HG: CPT | Performed by: STUDENT IN AN ORGANIZED HEALTH CARE EDUCATION/TRAINING PROGRAM

## 2025-03-17 RX ORDER — INSULIN LISPRO 100 [IU]/ML
15 INJECTION, SOLUTION INTRAVENOUS; SUBCUTANEOUS
Status: SHIPPED
Start: 2025-03-17 | End: 2025-03-17 | Stop reason: SDUPTHER

## 2025-03-17 RX ORDER — CHLORTHALIDONE 25 MG/1
25 TABLET ORAL DAILY
Status: SHIPPED
Start: 2025-03-17

## 2025-03-17 RX ORDER — HYDRALAZINE HYDROCHLORIDE 25 MG/1
25 TABLET, FILM COATED ORAL 2 TIMES DAILY
Start: 2025-03-17 | End: 2025-06-15

## 2025-03-17 RX ORDER — LISINOPRIL 40 MG/1
40 TABLET ORAL DAILY
Status: SHIPPED
Start: 2025-03-17

## 2025-03-17 RX ORDER — INSULIN LISPRO 100 [IU]/ML
10-15 INJECTION, SOLUTION INTRAVENOUS; SUBCUTANEOUS
Qty: 40.5 ML | Refills: 3 | Status: SHIPPED | OUTPATIENT
Start: 2025-03-17 | End: 2026-03-17

## 2025-03-17 RX ORDER — ASPIRIN 81 MG/1
81 TABLET ORAL DAILY
Status: SHIPPED | COMMUNITY
Start: 2025-03-17

## 2025-03-17 RX ORDER — ATORVASTATIN CALCIUM 40 MG/1
40 TABLET, FILM COATED ORAL DAILY
Status: SHIPPED
Start: 2025-03-17

## 2025-03-17 RX ORDER — SEMAGLUTIDE 0.68 MG/ML
0.5 INJECTION, SOLUTION SUBCUTANEOUS WEEKLY
Start: 2025-03-17 | End: 2025-03-17 | Stop reason: ALTCHOICE

## 2025-03-17 RX ORDER — NITROGLYCERIN 0.4 MG/1
0.4 TABLET SUBLINGUAL EVERY 5 MIN PRN
Status: SHIPPED
Start: 2025-03-17

## 2025-03-17 RX ORDER — ASPIRIN 81 MG/1
81 TABLET ORAL DAILY
Status: SHIPPED | COMMUNITY
Start: 2025-03-17 | End: 2025-03-17 | Stop reason: DRUGHIGH

## 2025-03-17 RX ORDER — AMLODIPINE BESYLATE 10 MG/1
10 TABLET ORAL DAILY
Status: SHIPPED
Start: 2025-03-17

## 2025-03-17 RX ORDER — METFORMIN HYDROCHLORIDE 1000 MG/1
1000 TABLET ORAL
Status: SHIPPED
Start: 2025-03-17

## 2025-03-17 RX ORDER — CARVEDILOL 25 MG/1
25 TABLET ORAL 2 TIMES DAILY
Start: 2025-03-17 | End: 2025-06-15

## 2025-03-17 ASSESSMENT — ENCOUNTER SYMPTOMS
PSYCHIATRIC NEGATIVE: 1
NEUROLOGICAL NEGATIVE: 1
HEMATOLOGIC/LYMPHATIC NEGATIVE: 1
CARDIOVASCULAR NEGATIVE: 1
CONSTITUTIONAL NEGATIVE: 1
ALLERGIC/IMMUNOLOGIC NEGATIVE: 1
MUSCULOSKELETAL NEGATIVE: 1
RESPIRATORY NEGATIVE: 1
EYES NEGATIVE: 1
ENDOCRINE NEGATIVE: 1
GASTROINTESTINAL NEGATIVE: 1

## 2025-03-17 ASSESSMENT — PAIN SCALES - GENERAL: PAINLEVEL_OUTOF10: 0-NO PAIN

## 2025-03-17 ASSESSMENT — PATIENT HEALTH QUESTIONNAIRE - PHQ9
2. FEELING DOWN, DEPRESSED OR HOPELESS: NOT AT ALL
SUM OF ALL RESPONSES TO PHQ9 QUESTIONS 1 AND 2: 0
1. LITTLE INTEREST OR PLEASURE IN DOING THINGS: NOT AT ALL

## 2025-03-17 NOTE — PATIENT INSTRUCTIONS
- Patient noting some intermittent lightheadedness with standing and/or looking up too quickly. This has been ongoing for the past 1-2 months. Discussed with the patient that this could have different causes. Will pursue evaluation.   - Blood pressure at goal today. Patient on a significant amount of medications; discussed with him that we should consider evaluation for resistant hypertension. Will check a renal artery ultrasound. Will check a renin and aldosterone level; expect this to be skewed a bit because of the lisinopril.  - Encouraged continued dietary, exercise, and lifestyle modification.  - Significant medication and problem list reconciliation done today.   - Will increase the patient's ozempic in an attempt to begin to back down on some of his insulin requirements. Discussed with him that he has some room to david with his short-acting insulin dosing.    Discussed routine and/or preventative care with the patient as outlined below:  - Labwork:   - Patient appears to be due for labwork. Ordered today.   - Will order labwork for the patient's next appointment. Encouraged the patient to get this labwork done one week prior to the next appointment.  - Imaging:   - Colorectal Cancer: Patient is due. Will order a colonoscopy.  - Tobacco Use: Patient smoked back in the day, will need an abdominal aortic aneurysm (AAA) ultrasound. Ordered.  - Encouraged the patient to continue to get his routine diabetic retinal exam.  - Immunizations:   - Encouraged the patient to get their shingles (shingrix) immunizations.  - Discussed the RSV immunization.

## 2025-03-17 NOTE — PROGRESS NOTES
[facility]: [facility]  PHYSICAL EXAM      Kevin Nunez is a 69 y.o. male that is presenting today for Annual Exam.    Assessment/Plan   - Patient noting some intermittent lightheadedness with standing and/or looking up too quickly. This has been ongoing for the past 1-2 months. Discussed with the patient that this could have different causes. Will pursue evaluation.   - Blood pressure at goal today. Patient on a significant amount of medications; discussed with him that we should consider evaluation for resistant hypertension. Will check a renal artery ultrasound. Will check a renin and aldosterone level; expect this to be skewed a bit because of the lisinopril.  - Encouraged continued dietary, exercise, and lifestyle modification.  - Significant medication and problem list reconciliation done today.   - Will increase the patient's ozempic in an attempt to begin to back down on some of his insulin requirements. Discussed with him that he has some room to david with his short-acting insulin dosing.    Discussed routine and/or preventative care with the patient as outlined below:  - Labwork:   - Patient appears to be due for labwork. Ordered today.   - Will order labwork for the patient's next appointment. Encouraged the patient to get this labwork done one week prior to the next appointment.  - Imaging:   - Colorectal Cancer: Patient is due. Will order a colonoscopy.  - Tobacco Use: Patient smoked back in the day, will need an abdominal aortic aneurysm (AAA) ultrasound. Ordered.  - Encouraged the patient to continue to get his routine diabetic retinal exam.  - Immunizations:   - Encouraged the patient to get their shingles (shingrix) immunizations.  - Discussed the RSV immunization.     Diagnoses and all orders for this visit:  Annual physical exam  Encounter for abdominal aortic aneurysm (AAA) screening  -     Vascular US abdominal aorta anuerysm AAA screening; Future  -     Follow Up  In Primary Care; Future  Encounter for colorectal cancer screening  -     Colonoscopy Screening; Average Risk Patient; Future  Personal history of nicotine dependence  -     Vascular US abdominal aorta anuerysm AAA screening; Future  -     Follow Up In Primary Care; Future  Encounter for routine laboratory testing  Vitamin D deficiency  -     Vitamin D 25-Hydroxy,Total (for eval of Vitamin D levels); Future  Encounter for prostate cancer screening  -     Prostate Specific Antigen; Future  Encounter for immunization  Class 1 obesity with serious comorbidity and body mass index (BMI) of 32.0 to 32.9 in adult, unspecified obesity type  -     Follow Up In Primary Care; Future  Type 2 diabetes mellitus with other specified complication, with long-term current use of insulin  -     empagliflozin (Jardiance) 25 mg; Take 1 tablet (25 mg) by mouth once daily.  -     metFORMIN (Glucophage) 1,000 mg tablet; Take 1 tablet (1,000 mg) by mouth 2 times daily (morning and late afternoon).  -     Hemoglobin A1C; Future  -     TSH with reflex to Free T4 if abnormal; Future  -     Albumin-Creatinine Ratio, Urine Random; Future  -     Testosterone,Free and Total; Future  -     Hemoglobin A1C; Future  -     Follow Up In Primary Care; Future  -     semaglutide (OZEMPIC) 1 mg/dose (4 mg/3 mL) pen injector; Inject 1 mg under the skin 1 (one) time per week.  -     insulin lispro (HumaLOG KwikPen Insulin) 100 unit/mL pen; Inject 10-15 Units under the skin 3 times daily (morning, midday, late afternoon).  -     insulin detemir (Levemir) 100 unit/mL injection; Inject 40 Units under the skin once daily in the morning.  Coronary artery disease involving native coronary artery of native heart without angina pectoris  -     aspirin 81 mg EC tablet; Take 1 tablet (81 mg) by mouth once daily.  -     atorvastatin (Lipitor) 40 mg tablet; Take 1 tablet (40 mg) by mouth once daily.  -     nitroglycerin (Nitrostat) 0.4 mg SL tablet; Place 1 tablet (0.4  mg) under the tongue every 5 minutes if needed for chest pain.  -     Transthoracic Echo (TTE) Complete; Future  -     perflutren lipid microspheres (Definity) injection 0.5-10 mL of dilution  -     sulfur hexafluoride microsphr (Lumason) injection 24.28 mg  -     perflutren protein A microsphere (Optison) injection 0.5 mL  -     Insert and maintain peripheral IV; Standing  -     Vascular US Carotid Artery Duplex Bilateral; Future  -     Vascular US renal artery duplex complete; Future  -     Lipid Panel; Future  -     Follow Up In Primary Care; Future  History of basal cell carcinoma  -     Follow Up In Primary Care; Future  Episodic lightheadedness  -     Transthoracic Echo (TTE) Complete; Future  -     perflutren lipid microspheres (Definity) injection 0.5-10 mL of dilution  -     sulfur hexafluoride microsphr (Lumason) injection 24.28 mg  -     perflutren protein A microsphere (Optison) injection 0.5 mL  -     Insert and maintain peripheral IV; Standing  -     Vascular US Carotid Artery Duplex Bilateral; Future  -     Vascular US renal artery duplex complete; Future  -     Follow Up In Primary Care; Future  Resistant hypertension  -     amLODIPine (Norvasc) 10 mg tablet; Take 1 tablet (10 mg) by mouth once daily.  -     chlorthalidone (Hygroton) 25 mg tablet; Take 1 tablet (25 mg) by mouth once daily.  -     lisinopril 40 mg tablet; Take 1 tablet (40 mg) by mouth once daily.  -     carvedilol (Coreg) 25 mg tablet; Take 1 tablet (25 mg) by mouth 2 times a day.  -     hydrALAZINE (Apresoline) 25 mg tablet; Take 1 tablet (25 mg) by mouth 2 times a day.  -     Transthoracic Echo (TTE) Complete; Future  -     perflutren lipid microspheres (Definity) injection 0.5-10 mL of dilution  -     sulfur hexafluoride microsphr (Lumason) injection 24.28 mg  -     perflutren protein A microsphere (Optison) injection 0.5 mL  -     Insert and maintain peripheral IV; Standing  -     Vascular US Carotid Artery Duplex Bilateral;  "Future  -     Vascular US renal artery duplex complete; Future  -     CBC and Auto Differential; Future  -     Basic Metabolic Panel; Future  -     Aldosterone/Renin Activity Ratio; Future  -     Comprehensive Metabolic Panel; Future  -     CBC and Auto Differential; Future  -     Follow Up In Primary Care; Future  Mixed hyperlipidemia  -     atorvastatin (Lipitor) 40 mg tablet; Take 1 tablet (40 mg) by mouth once daily.  -     Hepatic Function Panel; Future  -     Lipid Panel; Future  -     Follow Up In Primary Care; Future    Current Outpatient Medications   Medication Instructions    amLODIPine (NORVASC) 10 mg, oral, Daily    aspirin 81 mg, oral, Daily    atorvastatin (LIPITOR) 40 mg, oral, Daily    BD Ultra-Fine Mini Pen Needle 31 gauge x 3/16\" needle     carvedilol (COREG) 25 mg, oral, 2 times daily    chlorthalidone (HYGROTON) 25 mg, oral, Daily    coenzyme Q-10 200 mg, Daily    Dexcom G6 Sensor device AS DIRECTED, 1 SENSOR EVERY 10 DAYS 30 DAYS    Dexcom G6 Transmitter device AS DIRECTED WITH SENSOR DAILY 90 DAYS    empagliflozin (JARDIANCE) 25 mg, oral, Daily    hydrALAZINE (APRESOLINE) 25 mg, oral, 2 times daily    insulin detemir (LEVEMIR) 40 Units, subcutaneous, Every morning    insulin lispro (HUMALOG KWIKPEN INSULIN) 10-15 Units, subcutaneous, 3 times daily (morning, midday, late afternoon)    insulin syringe-needle U-100 (BD Insulin Syringe Ultra-Fine) 31G X 5/16\" 1 mL syringe Inject under the skin.    lisinopril 40 mg, oral, Daily    metFORMIN (GLUCOPHAGE) 1,000 mg, oral, 2 times daily (morning and late afternoon)    nitroglycerin (NITROSTAT) 0.4 mg, sublingual, Every 5 min PRN    OneTouch Ultra Test strip USE TO TEST 3 TIMES DAILY ICD 10: V05250 E11.9 90    pen needle, diabetic 31 gauge x 5/16\" needle Take per directed    semaglutide (OZEMPIC) 1 mg, subcutaneous, Weekly     Subjective   - The patient otherwise feels well and denies any acute symptoms or concerns at this time.  - The patient denies " any changes or progression of their chronic medical problems.  - The patient denies any problems or concerns with their medications.      Review of Systems   Constitutional: Negative.    HENT: Negative.     Eyes: Negative.    Respiratory: Negative.     Cardiovascular: Negative.    Gastrointestinal: Negative.    Endocrine: Negative.    Genitourinary: Negative.    Musculoskeletal: Negative.    Skin: Negative.    Allergic/Immunologic: Negative.    Neurological: Negative.    Hematological: Negative.    Psychiatric/Behavioral: Negative.     All other systems reviewed and are negative.     Objective   Vitals:    03/17/25 0833   BP: 122/70   Pulse: 95   SpO2: 93%     Body mass index is 32.77 kg/m².  Physical Exam  Vitals and nursing note reviewed.   Constitutional:       General: He is not in acute distress.     Appearance: Normal appearance. He is not ill-appearing.   HENT:      Head: Normocephalic and atraumatic.      Right Ear: Tympanic membrane, ear canal and external ear normal. There is no impacted cerumen.      Left Ear: Tympanic membrane, ear canal and external ear normal. There is no impacted cerumen.      Nose: Nose normal.      Mouth/Throat:      Mouth: Mucous membranes are moist.      Pharynx: Oropharynx is clear. No oropharyngeal exudate or posterior oropharyngeal erythema.   Eyes:      General: No scleral icterus.        Right eye: No discharge.         Left eye: No discharge.      Extraocular Movements: Extraocular movements intact.      Conjunctiva/sclera: Conjunctivae normal.      Pupils: Pupils are equal, round, and reactive to light.   Neck:      Vascular: No carotid bruit.   Cardiovascular:      Rate and Rhythm: Normal rate and regular rhythm.      Pulses: Normal pulses.      Heart sounds: Normal heart sounds. No murmur heard.     No friction rub. No gallop.   Pulmonary:      Effort: Pulmonary effort is normal. No respiratory distress.      Breath sounds: Normal breath sounds.   Abdominal:      General:  "Abdomen is flat. Bowel sounds are normal.      Palpations: Abdomen is soft.      Tenderness: There is no abdominal tenderness.      Hernia: No hernia is present.   Musculoskeletal:         General: No swelling. Normal range of motion.      Cervical back: Normal range of motion.   Lymphadenopathy:      Cervical: No cervical adenopathy.   Skin:     General: Skin is warm and dry.      Coloration: Skin is not jaundiced.      Findings: No rash.   Neurological:      General: No focal deficit present.      Mental Status: He is alert and oriented to person, place, and time. Mental status is at baseline.   Psychiatric:         Mood and Affect: Mood normal.         Behavior: Behavior normal.       Diagnostic Results   Lab Results   Component Value Date    GLUCOSE 101 (H) 11/03/2023    CALCIUM 9.2 11/03/2023     11/03/2023    K 3.7 11/03/2023    CO2 27 11/03/2023     11/03/2023    BUN 24 11/03/2023    CREATININE 0.90 11/03/2023     Lab Results   Component Value Date    ALT 25 11/03/2023    AST 23 11/03/2023    ALKPHOS 100 11/03/2023    BILITOT 0.4 11/03/2023     Lab Results   Component Value Date    WBC 11.3 11/03/2023    HGB 15.9 11/03/2023    HCT 45.9 11/03/2023    MCV 88 11/03/2023     11/03/2023     Lab Results   Component Value Date    CHOL 146 05/25/2023    CHOL 164 03/08/2022    CHOL 163 12/16/2020     Lab Results   Component Value Date    HDL 42 05/25/2023    HDL 40 (L) 03/08/2022    HDL 36 (L) 12/16/2020     Lab Results   Component Value Date    LDLCALC 61 (L) 05/25/2023    LDLCALC 84 03/08/2022    LDLCALC 60 (L) 12/16/2020     Lab Results   Component Value Date    TRIG 216 (H) 05/25/2023    TRIG 199 (H) 03/08/2022    TRIG 337 (H) 12/16/2020     No components found for: \"CHOLHDL\"  Lab Results   Component Value Date    HGBA1C 6.6 (H) 11/03/2023     Other labs not included in the list above were reviewed either before or during this encounter.    History   Past Medical History:   Diagnosis Date    " "Age-related nuclear cataract of both eyes     Age-related nuclear cataract of both eyes     Basal cell carcinoma of right ear 2023    Malignant neoplasm of temporal bone (Multi) 2023    Miosis     Personal history of other diseases of the circulatory system     History of hypertension    Personal history of other diseases of the circulatory system     History of coronary artery disease    Personal history of other endocrine, nutritional and metabolic disease     History of diabetes mellitus    Preglaucoma, bilateral      Past Surgical History:   Procedure Laterality Date    OTHER SURGICAL HISTORY  2022    Appendectomy     No family history on file.  Social History     Socioeconomic History    Marital status:      Spouse name: Not on file    Number of children: Not on file    Years of education: Not on file    Highest education level: Not on file   Occupational History    Not on file   Tobacco Use    Smoking status: Former     Current packs/day: 0.00     Types: Cigarettes     Quit date:      Years since quittin.2    Smokeless tobacco: Not on file   Vaping Use    Vaping status: Never Used   Substance and Sexual Activity    Alcohol use: Yes    Drug use: Never    Sexual activity: Not on file   Other Topics Concern    Not on file   Social History Narrative    Not on file     Social Drivers of Health     Financial Resource Strain: Not on file   Food Insecurity: Not on file   Transportation Needs: Not on file   Physical Activity: Not on file   Stress: Not on file   Social Connections: Not on file   Intimate Partner Violence: Not on file   Housing Stability: Not on file     No Known Allergies  Current Outpatient Medications on File Prior to Visit   Medication Sig Dispense Refill    BD Ultra-Fine Mini Pen Needle 31 gauge x 3/16\" needle       coenzyme Q-10 200 mg capsule Take 1 capsule (200 mg) by mouth once daily. With a meal      Dexcom G6 Sensor device AS DIRECTED, 1 SENSOR EVERY 10 DAYS " "30 DAYS      Dexcom G6 Transmitter device AS DIRECTED WITH SENSOR DAILY 90 DAYS      insulin syringe-needle U-100 (BD Insulin Syringe Ultra-Fine) 31G X 5/16\" 1 mL syringe Inject under the skin.      OneTouch Ultra Test strip USE TO TEST 3 TIMES DAILY ICD 10: L01152 E11.9 90 300 strip 3    pen needle, diabetic 31 gauge x 5/16\" needle Take per directed      [DISCONTINUED] amLODIPine (Norvasc) 10 mg tablet TAKE 1 TABLET BY MOUTH EVERY DAY 90 tablet 3    [DISCONTINUED] aspirin 81 mg EC tablet Take 1 tablet (81 mg) by mouth once daily.      [DISCONTINUED] atorvastatin (Lipitor) 40 mg tablet TAKE 1 TABLET BY MOUTH EVERY DAY 90 tablet 3    [DISCONTINUED] carvedilol (Coreg) 25 mg tablet TAKE 1 TABLET BY MOUTH TWICE A DAY WITH FOOD FOR 90 DAYS 180 tablet 3    [DISCONTINUED] chlorthalidone (Hygroton) 25 mg tablet TAKE 1 TABLET BY MOUTH EVERY DAY 90 tablet 3    [DISCONTINUED] empagliflozin (Jardiance) 25 mg Take 1 tablet (25 mg) by mouth once daily. 90 tablet 3    [DISCONTINUED] hydrALAZINE (Apresoline) 25 mg tablet TAKE 1 TABLET BY MOUTH TWICE A DAY WITH FOOD FOR 90 DAYS 180 tablet 3    [DISCONTINUED] insulin detemir (Levemir FlexPen) 100 unit/mL (3 mL) pen once every 24 hours. Take per directed      [DISCONTINUED] insulin detemir (Levemir) 100 unit/mL injection Inject 45 Doses under the skin. Take as directed      [DISCONTINUED] INSULIN DETEMIR U-100 SUBQ Inject under the skin. Take per directed      [DISCONTINUED] insulin glargine (Lantus Solostar U-100 Insulin) 100 unit/mL (3 mL) pen Inject under the skin. Take per directed      [DISCONTINUED] insulin glargine (Lantus U-100 Insulin) 100 unit/mL injection 30 Units. Take per directed      [DISCONTINUED] insulin lispro (HumaLOG KwikPen Insulin) 100 unit/mL injection every 8 hours. Take per directed      [DISCONTINUED] insulin lispro (HumaLOG U-100 Insulin) 100 unit/mL injection 10 Units. Take per directed      [DISCONTINUED] lisinopril 40 mg tablet Take 1 tablet (40 mg) by " mouth once daily. 90 tablet 3    [DISCONTINUED] metFORMIN (Glucophage) 1,000 mg tablet TAKE 1 TABLET BY MOUTH TWICE A DAY WITH MEALS 180 tablet 3    [DISCONTINUED] nitroglycerin (Nitrostat) 0.4 mg SL tablet Take per directed      [DISCONTINUED] Ozempic 0.25 mg or 0.5 mg (2 mg/3 mL) pen injector INJECT 0.5 MG SUBCUTANEOUSLY ONE TIME PER WEEK 3 mL 3    [DISCONTINUED] carboxymethylcellulose (Refresh Celluvisc) 1 % ophthalmic solution dropperette Administer 1 drop into both eyes if needed. Take per directed (Patient not taking: Reported on 3/17/2025)      [DISCONTINUED] carboxymethylcellulose (Refresh Plus) 0.5 % ophthalmic solution Administer into affected eye(s). Take per directed (Patient not taking: Reported on 3/17/2025)      [DISCONTINUED] chlorhexidine (Hibiclens) 4 % external liquid Take per directed (Patient not taking: Reported on 3/17/2025)      [DISCONTINUED] chlorhexidine (Peridex) 0.12 % solution Take by mouth every 12 hours. Take per directed (Patient not taking: Reported on 3/17/2025)      [DISCONTINUED] coenzyme Q-10 300 mg capsule capsule Take by mouth. Take per directed (Patient not taking: Reported on 3/17/2025)      [DISCONTINUED] magnesium hydroxide (Milk of Magnesia) 400 mg/5 mL suspension Take 15 mL by mouth. Take per directed (Patient not taking: Reported on 3/17/2025)      [DISCONTINUED] ondansetron (Zofran) 4 mg tablet Take by mouth. Take per directed (Patient not taking: Reported on 3/17/2025)      [DISCONTINUED] oxyCODONE (Roxicodone) 5 mg immediate release tablet Take by mouth. Take per directed (Patient not taking: Reported on 3/17/2025)      [DISCONTINUED] petrolatum,white (white petrolatum, bulk,) 100 % gel Apply topically twice a day. Take per directed (Patient not taking: Reported on 3/17/2025)      [DISCONTINUED] polyethylene glycol (Glycolax, Miralax) 17 gram/dose powder Take by mouth. Take per directed (Patient not taking: Mix of powder and drink. Reported on 3/17/2025)       [DISCONTINUED] sennosides (Senokot) 8.6 mg tablet Take by mouth. Take per directed (Patient not taking: Reported on 3/17/2025)       No current facility-administered medications on file prior to visit.     Immunization History   Administered Date(s) Administered    Flu vaccine (IIV4), preservative free *Check age/dose* 12/14/2015, 09/23/2019    Flu vaccine, quadrivalent, high-dose, preservative free, age 65y+ (FLUZONE) 10/22/2021    Flu vaccine, trivalent, preservative free, HIGH-DOSE, age 65y+ (Fluzone) 01/29/2025    Pfizer COVID-19 vaccine, 12 years and older, (30mcg/0.3mL) (Comirnaty) 01/29/2025    Pfizer Purple Cap SARS-CoV-2 03/23/2021, 04/02/2021, 04/23/2021, 10/22/2021, 04/06/2022    Pneumococcal conjugate vaccine, 13-valent (PREVNAR 13) 09/23/2019    Pneumococcal polysaccharide vaccine, 23-valent, age 2 years and older (PNEUMOVAX 23) 09/08/2022    Tdap vaccine, age 7 year and older (BOOSTRIX, ADACEL) 10/28/2019    Tetanus toxoid, adsorbed 12/10/2008, 01/27/2010     Patient's medical history was reviewed and updated either before or during this encounter.       Abdirahman Carrizales MD

## 2025-03-18 RX ORDER — INSULIN GLARGINE-YFGN 100 [IU]/ML
40 INJECTION, SOLUTION SUBCUTANEOUS EVERY MORNING
Qty: 3 ML | Refills: 12 | Status: SHIPPED | OUTPATIENT
Start: 2025-03-18 | End: 2026-03-18

## 2025-03-22 LAB
25(OH)D3+25(OH)D2 SERPL-MCNC: 42 NG/ML (ref 30–100)
ALBUMIN SERPL-MCNC: 4.4 G/DL (ref 3.6–5.1)
ALBUMIN/CREAT UR: NORMAL
ALBUMIN/GLOB SERPL: 1.8 (CALC) (ref 1–2.5)
ALDOST SERPL-MCNC: NORMAL NG/DL
ALDOST/RENIN PLAS-RTO: NORMAL {RATIO}
ALP SERPL-CCNC: 74 U/L (ref 35–144)
ALT SERPL-CCNC: 20 U/L (ref 9–46)
ANION GAP SERPL CALCULATED.4IONS-SCNC: 11 MMOL/L (CALC) (ref 7–17)
AST SERPL-CCNC: 17 U/L (ref 10–35)
BASOPHILS # BLD AUTO: 51 CELLS/UL (ref 0–200)
BASOPHILS NFR BLD AUTO: 0.5 %
BILIRUB DIRECT SERPL-MCNC: 0.1 MG/DL
BILIRUB INDIRECT SERPL-MCNC: 0.5 MG/DL (CALC) (ref 0.2–1.2)
BILIRUB SERPL-MCNC: 0.6 MG/DL (ref 0.2–1.2)
BUN SERPL-MCNC: 28 MG/DL (ref 7–25)
BUN/CREAT SERPL: 26 (CALC) (ref 6–22)
CALCIUM SERPL-MCNC: 9.6 MG/DL (ref 8.6–10.3)
CHLORIDE SERPL-SCNC: 99 MMOL/L (ref 98–110)
CHOLEST SERPL-MCNC: 127 MG/DL
CHOLEST/HDLC SERPL: 3.3 (CALC)
CO2 SERPL-SCNC: 29 MMOL/L (ref 20–32)
CREAT SERPL-MCNC: 1.09 MG/DL (ref 0.7–1.28)
CREAT UR-MCNC: NORMAL MG/DL
EGFRCR SERPLBLD CKD-EPI 2021: 73 ML/MIN/1.73M2
EOSINOPHIL # BLD AUTO: 316 CELLS/UL (ref 15–500)
EOSINOPHIL NFR BLD AUTO: 3.1 %
ERYTHROCYTE [DISTWIDTH] IN BLOOD BY AUTOMATED COUNT: 12.7 % (ref 11–15)
EST. AVERAGE GLUCOSE BLD GHB EST-MCNC: 148 MG/DL
EST. AVERAGE GLUCOSE BLD GHB EST-SCNC: 8.2 MMOL/L
GLOBULIN SER CALC-MCNC: 2.4 G/DL (CALC) (ref 1.9–3.7)
GLUCOSE SERPL-MCNC: 141 MG/DL (ref 65–99)
HBA1C MFR BLD: 6.8 % OF TOTAL HGB
HCT VFR BLD AUTO: 47.6 % (ref 38.5–50)
HDLC SERPL-MCNC: 39 MG/DL
HGB BLD-MCNC: 15.7 G/DL (ref 13.2–17.1)
LDLC SERPL CALC-MCNC: 55 MG/DL (CALC)
LYMPHOCYTES # BLD AUTO: 2540 CELLS/UL (ref 850–3900)
LYMPHOCYTES NFR BLD AUTO: 24.9 %
MCH RBC QN AUTO: 30.5 PG (ref 27–33)
MCHC RBC AUTO-ENTMCNC: 33 G/DL (ref 32–36)
MCV RBC AUTO: 92.4 FL (ref 80–100)
MICROALBUMIN UR-MCNC: NORMAL
MONOCYTES # BLD AUTO: 765 CELLS/UL (ref 200–950)
MONOCYTES NFR BLD AUTO: 7.5 %
NEUTROPHILS # BLD AUTO: 6528 CELLS/UL (ref 1500–7800)
NEUTROPHILS NFR BLD AUTO: 64 %
NONHDLC SERPL-MCNC: 88 MG/DL (CALC)
PLATELET # BLD AUTO: 247 THOUSAND/UL (ref 140–400)
PMV BLD REES-ECKER: 10.6 FL (ref 7.5–12.5)
POTASSIUM SERPL-SCNC: 4.1 MMOL/L (ref 3.5–5.3)
PROT SERPL-MCNC: 6.8 G/DL (ref 6.1–8.1)
PSA SERPL-MCNC: 0.87 NG/ML
RBC # BLD AUTO: 5.15 MILLION/UL (ref 4.2–5.8)
RENIN PLAS-CCNC: NORMAL NG/ML/H
SODIUM SERPL-SCNC: 139 MMOL/L (ref 135–146)
TESTOST FREE SERPL-MCNC: NORMAL PG/ML
TESTOST SERPL-MCNC: NORMAL NG/DL
TRIGL SERPL-MCNC: 313 MG/DL
TSH SERPL-ACNC: 3.22 MIU/L (ref 0.4–4.5)
WBC # BLD AUTO: 10.2 THOUSAND/UL (ref 3.8–10.8)

## 2025-03-27 LAB
25(OH)D3+25(OH)D2 SERPL-MCNC: 42 NG/ML (ref 30–100)
ALBUMIN SERPL-MCNC: 4.4 G/DL (ref 3.6–5.1)
ALBUMIN/CREAT UR: 3 MG/G CREAT
ALBUMIN/GLOB SERPL: 1.8 (CALC) (ref 1–2.5)
ALDOST SERPL-MCNC: 5 NG/DL
ALDOST/RENIN PLAS-RTO: 0.4 RATIO (ref 0.9–28.9)
ALP SERPL-CCNC: 74 U/L (ref 35–144)
ALT SERPL-CCNC: 20 U/L (ref 9–46)
ANION GAP SERPL CALCULATED.4IONS-SCNC: 11 MMOL/L (CALC) (ref 7–17)
AST SERPL-CCNC: 17 U/L (ref 10–35)
BASOPHILS # BLD AUTO: 51 CELLS/UL (ref 0–200)
BASOPHILS NFR BLD AUTO: 0.5 %
BILIRUB DIRECT SERPL-MCNC: 0.1 MG/DL
BILIRUB INDIRECT SERPL-MCNC: 0.5 MG/DL (CALC) (ref 0.2–1.2)
BILIRUB SERPL-MCNC: 0.6 MG/DL (ref 0.2–1.2)
BUN SERPL-MCNC: 28 MG/DL (ref 7–25)
BUN/CREAT SERPL: 26 (CALC) (ref 6–22)
CALCIUM SERPL-MCNC: 9.6 MG/DL (ref 8.6–10.3)
CHLORIDE SERPL-SCNC: 99 MMOL/L (ref 98–110)
CHOLEST SERPL-MCNC: 127 MG/DL
CHOLEST/HDLC SERPL: 3.3 (CALC)
CO2 SERPL-SCNC: 29 MMOL/L (ref 20–32)
CREAT SERPL-MCNC: 1.09 MG/DL (ref 0.7–1.28)
CREAT UR-MCNC: 68 MG/DL (ref 20–320)
EGFRCR SERPLBLD CKD-EPI 2021: 73 ML/MIN/1.73M2
EOSINOPHIL # BLD AUTO: 316 CELLS/UL (ref 15–500)
EOSINOPHIL NFR BLD AUTO: 3.1 %
ERYTHROCYTE [DISTWIDTH] IN BLOOD BY AUTOMATED COUNT: 12.7 % (ref 11–15)
EST. AVERAGE GLUCOSE BLD GHB EST-MCNC: 148 MG/DL
EST. AVERAGE GLUCOSE BLD GHB EST-SCNC: 8.2 MMOL/L
GLOBULIN SER CALC-MCNC: 2.4 G/DL (CALC) (ref 1.9–3.7)
GLUCOSE SERPL-MCNC: 141 MG/DL (ref 65–99)
HBA1C MFR BLD: 6.8 % OF TOTAL HGB
HCT VFR BLD AUTO: 47.6 % (ref 38.5–50)
HDLC SERPL-MCNC: 39 MG/DL
HGB BLD-MCNC: 15.7 G/DL (ref 13.2–17.1)
LDLC SERPL CALC-MCNC: 55 MG/DL (CALC)
LYMPHOCYTES # BLD AUTO: 2540 CELLS/UL (ref 850–3900)
LYMPHOCYTES NFR BLD AUTO: 24.9 %
MCH RBC QN AUTO: 30.5 PG (ref 27–33)
MCHC RBC AUTO-ENTMCNC: 33 G/DL (ref 32–36)
MCV RBC AUTO: 92.4 FL (ref 80–100)
MICROALBUMIN UR-MCNC: 0.2 MG/DL
MONOCYTES # BLD AUTO: 765 CELLS/UL (ref 200–950)
MONOCYTES NFR BLD AUTO: 7.5 %
NEUTROPHILS # BLD AUTO: 6528 CELLS/UL (ref 1500–7800)
NEUTROPHILS NFR BLD AUTO: 64 %
NONHDLC SERPL-MCNC: 88 MG/DL (CALC)
PLATELET # BLD AUTO: 247 THOUSAND/UL (ref 140–400)
PMV BLD REES-ECKER: 10.6 FL (ref 7.5–12.5)
POTASSIUM SERPL-SCNC: 4.1 MMOL/L (ref 3.5–5.3)
PROT SERPL-MCNC: 6.8 G/DL (ref 6.1–8.1)
PSA SERPL-MCNC: 0.87 NG/ML
RBC # BLD AUTO: 5.15 MILLION/UL (ref 4.2–5.8)
RENIN PLAS-CCNC: 11.83 NG/ML/H (ref 0.25–5.82)
SODIUM SERPL-SCNC: 139 MMOL/L (ref 135–146)
TESTOST FREE SERPL-MCNC: 28.7 PG/ML (ref 30–135)
TESTOST SERPL-MCNC: 271 NG/DL (ref 250–1100)
TRIGL SERPL-MCNC: 313 MG/DL
TSH SERPL-ACNC: 3.22 MIU/L (ref 0.4–4.5)
WBC # BLD AUTO: 10.2 THOUSAND/UL (ref 3.8–10.8)

## 2025-04-26 DIAGNOSIS — E11.65 TYPE 2 DIABETES MELLITUS WITH HYPERGLYCEMIA (MULTI): ICD-10-CM

## 2025-04-28 ENCOUNTER — APPOINTMENT (OUTPATIENT)
Dept: RADIOLOGY | Facility: HOSPITAL | Age: 70
End: 2025-04-28
Payer: COMMERCIAL

## 2025-04-28 ENCOUNTER — HOSPITAL ENCOUNTER (OUTPATIENT)
Dept: VASCULAR MEDICINE | Facility: CLINIC | Age: 70
Discharge: HOME | End: 2025-04-28
Payer: COMMERCIAL

## 2025-04-28 DIAGNOSIS — Z13.6 ENCOUNTER FOR ABDOMINAL AORTIC ANEURYSM (AAA) SCREENING: ICD-10-CM

## 2025-04-28 DIAGNOSIS — Z87.891 PERSONAL HISTORY OF NICOTINE DEPENDENCE: ICD-10-CM

## 2025-04-28 DIAGNOSIS — R42 EPISODIC LIGHTHEADEDNESS: ICD-10-CM

## 2025-04-28 DIAGNOSIS — I25.10 CORONARY ARTERY DISEASE INVOLVING NATIVE CORONARY ARTERY OF NATIVE HEART WITHOUT ANGINA PECTORIS: ICD-10-CM

## 2025-04-28 DIAGNOSIS — I1A.0 RESISTANT HYPERTENSION: ICD-10-CM

## 2025-04-28 DIAGNOSIS — I10 ESSENTIAL (PRIMARY) HYPERTENSION: ICD-10-CM

## 2025-04-28 PROCEDURE — 76706 US ABDL AORTA SCREEN AAA: CPT | Performed by: INTERNAL MEDICINE

## 2025-04-28 PROCEDURE — 93975 VASCULAR STUDY: CPT

## 2025-04-28 PROCEDURE — 93880 EXTRACRANIAL BILAT STUDY: CPT | Performed by: INTERNAL MEDICINE

## 2025-04-28 PROCEDURE — 93880 EXTRACRANIAL BILAT STUDY: CPT

## 2025-04-28 PROCEDURE — 76706 US ABDL AORTA SCREEN AAA: CPT

## 2025-04-28 RX ORDER — SEMAGLUTIDE 0.68 MG/ML
INJECTION, SOLUTION SUBCUTANEOUS
Qty: 3 ML | Refills: 3 | Status: SHIPPED | OUTPATIENT
Start: 2025-04-28

## 2025-04-29 ENCOUNTER — TELEPHONE (OUTPATIENT)
Dept: PRIMARY CARE | Facility: CLINIC | Age: 70
End: 2025-04-29
Payer: COMMERCIAL

## 2025-04-29 NOTE — TELEPHONE ENCOUNTER
Patient states that he was supposed to have repeat labs around this time but they were never ordered. Pt requesting orders.

## 2025-05-04 DIAGNOSIS — Z79.4 TYPE 2 DIABETES MELLITUS WITH OTHER SPECIFIED COMPLICATION, WITH LONG-TERM CURRENT USE OF INSULIN: ICD-10-CM

## 2025-05-04 DIAGNOSIS — E11.69 TYPE 2 DIABETES MELLITUS WITH OTHER SPECIFIED COMPLICATION, WITH LONG-TERM CURRENT USE OF INSULIN: ICD-10-CM

## 2025-05-05 RX ORDER — EMPAGLIFLOZIN 25 MG/1
25 TABLET, FILM COATED ORAL DAILY
Qty: 90 TABLET | Refills: 3 | Status: SHIPPED | OUTPATIENT
Start: 2025-05-05

## 2025-05-09 ENCOUNTER — HOSPITAL ENCOUNTER (OUTPATIENT)
Dept: CARDIOLOGY | Facility: HOSPITAL | Age: 70
Discharge: HOME | End: 2025-05-09
Payer: COMMERCIAL

## 2025-05-09 ENCOUNTER — APPOINTMENT (OUTPATIENT)
Dept: VASCULAR MEDICINE | Facility: CLINIC | Age: 70
End: 2025-05-09
Payer: COMMERCIAL

## 2025-05-09 VITALS — SYSTOLIC BLOOD PRESSURE: 122 MMHG | DIASTOLIC BLOOD PRESSURE: 70 MMHG

## 2025-05-09 DIAGNOSIS — R42 EPISODIC LIGHTHEADEDNESS: ICD-10-CM

## 2025-05-09 DIAGNOSIS — I1A.0 RESISTANT HYPERTENSION: ICD-10-CM

## 2025-05-09 DIAGNOSIS — I25.10 CORONARY ARTERY DISEASE INVOLVING NATIVE CORONARY ARTERY OF NATIVE HEART WITHOUT ANGINA PECTORIS: ICD-10-CM

## 2025-05-09 DIAGNOSIS — I10 ESSENTIAL (PRIMARY) HYPERTENSION: ICD-10-CM

## 2025-05-09 LAB
AORTIC VALVE MEAN GRADIENT: 3 MMHG
AORTIC VALVE PEAK VELOCITY: 1.23 M/S
AV PEAK GRADIENT: 6 MMHG
AVA (PEAK VEL): 2.61 CM2
AVA (VTI): 2.49 CM2
EJECTION FRACTION APICAL 4 CHAMBER: 62.9
EJECTION FRACTION: 63 %
LEFT VENTRICLE INTERNAL DIMENSION DIASTOLE: 4.71 CM (ref 3.5–6)
LEFT VENTRICULAR OUTFLOW TRACT DIAMETER: 2.02 CM
LV EJECTION FRACTION BIPLANE: 66 %
MITRAL VALVE E/A RATIO: 0
MITRAL VALVE E/E' RATIO: 0
RIGHT VENTRICLE PEAK SYSTOLIC PRESSURE: 39.3 MMHG
TRICUSPID ANNULAR PLANE SYSTOLIC EXCURSION: 2 CM

## 2025-05-09 PROCEDURE — 93306 TTE W/DOPPLER COMPLETE: CPT | Performed by: INTERNAL MEDICINE

## 2025-05-09 PROCEDURE — 93306 TTE W/DOPPLER COMPLETE: CPT

## 2025-08-03 DIAGNOSIS — E11.69 TYPE 2 DIABETES MELLITUS WITH OTHER SPECIFIED COMPLICATION, WITH LONG-TERM CURRENT USE OF INSULIN: ICD-10-CM

## 2025-08-03 DIAGNOSIS — Z79.4 TYPE 2 DIABETES MELLITUS WITH OTHER SPECIFIED COMPLICATION, WITH LONG-TERM CURRENT USE OF INSULIN: ICD-10-CM

## 2025-08-03 DIAGNOSIS — I1A.0 RESISTANT HYPERTENSION: ICD-10-CM

## 2025-08-04 RX ORDER — LISINOPRIL 40 MG/1
40 TABLET ORAL DAILY
Qty: 90 TABLET | Refills: 3 | Status: SHIPPED | OUTPATIENT
Start: 2025-08-04

## 2025-08-04 RX ORDER — METFORMIN HYDROCHLORIDE 1000 MG/1
1000 TABLET ORAL
Qty: 180 TABLET | Refills: 3 | Status: SHIPPED | OUTPATIENT
Start: 2025-08-04

## 2025-08-04 RX ORDER — HYDRALAZINE HYDROCHLORIDE 25 MG/1
25 TABLET, FILM COATED ORAL 2 TIMES DAILY
Qty: 180 TABLET | Refills: 3 | Status: SHIPPED | OUTPATIENT
Start: 2025-08-04 | End: 2025-11-02

## 2025-08-30 DIAGNOSIS — E11.65 TYPE 2 DIABETES MELLITUS WITH HYPERGLYCEMIA (MULTI): ICD-10-CM

## 2025-09-02 RX ORDER — SEMAGLUTIDE 0.68 MG/ML
INJECTION, SOLUTION SUBCUTANEOUS
Qty: 3 ML | Refills: 3 | Status: SHIPPED | OUTPATIENT
Start: 2025-09-02

## 2025-09-29 ENCOUNTER — APPOINTMENT (OUTPATIENT)
Dept: OPHTHALMOLOGY | Facility: CLINIC | Age: 70
End: 2025-09-29
Payer: COMMERCIAL